# Patient Record
Sex: FEMALE | URBAN - METROPOLITAN AREA
[De-identification: names, ages, dates, MRNs, and addresses within clinical notes are randomized per-mention and may not be internally consistent; named-entity substitution may affect disease eponyms.]

---

## 2023-01-11 ENCOUNTER — TELEPHONE (OUTPATIENT)
Dept: FAMILY MEDICINE CLINIC | Age: 33
End: 2023-01-11

## 2023-01-11 NOTE — TELEPHONE ENCOUNTER
----- Message from Linda Amato sent at 1/9/2023 12:35 PM EST -----  Subject: Appointment Request    Reason for Call: New Patient/New to Provider Appointment needed: New   Patient Request Appointment    QUESTIONS    Reason for appointment request? No appointments available during search     Additional Information for Provider? new patient establishing care, please   call patient to schedule first appointment   ---------------------------------------------------------------------------  --------------  [id number] [employer]  [phone]; OK to leave message on voicemail  ---------------------------------------------------------------------------  --------------  SCRIPT ANSWERS  COVID Screen: Toro Varma

## 2023-01-31 ENCOUNTER — OFFICE VISIT (OUTPATIENT)
Dept: FAMILY MEDICINE CLINIC | Age: 33
End: 2023-01-31
Payer: COMMERCIAL

## 2023-01-31 VITALS
HEIGHT: 62 IN | BODY MASS INDEX: 26.68 KG/M2 | DIASTOLIC BLOOD PRESSURE: 74 MMHG | OXYGEN SATURATION: 99 % | SYSTOLIC BLOOD PRESSURE: 116 MMHG | WEIGHT: 145 LBS | HEART RATE: 85 BPM

## 2023-01-31 DIAGNOSIS — Z13.1 SCREENING FOR DIABETES MELLITUS: ICD-10-CM

## 2023-01-31 DIAGNOSIS — R74.8 ELEVATED LIVER ENZYMES: ICD-10-CM

## 2023-01-31 DIAGNOSIS — Z00.01 ENCOUNTER FOR ROUTINE ADULT HEALTH EXAMINATION WITH ABNORMAL FINDINGS: Primary | ICD-10-CM

## 2023-01-31 DIAGNOSIS — Z13.6 ENCOUNTER FOR LIPID SCREENING FOR CARDIOVASCULAR DISEASE: ICD-10-CM

## 2023-01-31 DIAGNOSIS — Z13.220 ENCOUNTER FOR LIPID SCREENING FOR CARDIOVASCULAR DISEASE: ICD-10-CM

## 2023-01-31 PROBLEM — K37 APPENDICITIS: Status: ACTIVE | Noted: 2021-07-29

## 2023-01-31 PROCEDURE — 99385 PREV VISIT NEW AGE 18-39: CPT | Performed by: FAMILY MEDICINE

## 2023-01-31 PROCEDURE — 3725F SCREEN DEPRESSION PERFORMED: CPT | Performed by: FAMILY MEDICINE

## 2023-01-31 SDOH — ECONOMIC STABILITY: TRANSPORTATION INSECURITY
IN THE PAST 12 MONTHS, HAS LACK OF TRANSPORTATION KEPT YOU FROM MEETINGS, WORK, OR FROM GETTING THINGS NEEDED FOR DAILY LIVING?: NO

## 2023-01-31 SDOH — ECONOMIC STABILITY: FOOD INSECURITY: WITHIN THE PAST 12 MONTHS, THE FOOD YOU BOUGHT JUST DIDN'T LAST AND YOU DIDN'T HAVE MONEY TO GET MORE.: NEVER TRUE

## 2023-01-31 SDOH — ECONOMIC STABILITY: FOOD INSECURITY: WITHIN THE PAST 12 MONTHS, YOU WORRIED THAT YOUR FOOD WOULD RUN OUT BEFORE YOU GOT MONEY TO BUY MORE.: NEVER TRUE

## 2023-01-31 SDOH — ECONOMIC STABILITY: TRANSPORTATION INSECURITY
IN THE PAST 12 MONTHS, HAS THE LACK OF TRANSPORTATION KEPT YOU FROM MEDICAL APPOINTMENTS OR FROM GETTING MEDICATIONS?: NO

## 2023-01-31 ASSESSMENT — PATIENT HEALTH QUESTIONNAIRE - PHQ9
SUM OF ALL RESPONSES TO PHQ9 QUESTIONS 1 & 2: 0
SUM OF ALL RESPONSES TO PHQ QUESTIONS 1-9: 0
SUM OF ALL RESPONSES TO PHQ QUESTIONS 1-9: 0
2. FEELING DOWN, DEPRESSED OR HOPELESS: 0
1. LITTLE INTEREST OR PLEASURE IN DOING THINGS: 0
SUM OF ALL RESPONSES TO PHQ QUESTIONS 1-9: 0
SUM OF ALL RESPONSES TO PHQ QUESTIONS 1-9: 0

## 2023-01-31 ASSESSMENT — SOCIAL DETERMINANTS OF HEALTH (SDOH): HOW HARD IS IT FOR YOU TO PAY FOR THE VERY BASICS LIKE FOOD, HOUSING, MEDICAL CARE, AND HEATING?: NOT VERY HARD

## 2023-01-31 NOTE — PROGRESS NOTES
Chief Complaint   Patient presents with    New Patient       SUBJECTIVE:   Dempsey Ganser is a 27 y.o. female presenting to [facility] care. HPI:   No concerns   Hasn't had a physical or blood work in 3yrs. Was required to be seen when she worked with [employer]    Family hx of breast Ca- maternal aunt (diagnosed at 35yrs and had it twice); great grandmother on mom's side; mom has had thyroid ca (at 40yrs) and full hyster d/t 'something w/ her ovaries' not cancer    Family hx of CRC- no    Pap- cotest 2022 neg; due in 2025; no hx of abnormal paps. Hx of dense breast tissue prior to kids and had 3 breast US  LMP 23; condoms for contraception. Not trying for kids right now. Maternal Grandmother  from lung Ca; no hx of smoking  Paternal grandparents  from lung Ca, hx of smoking    Meds: Probiotic, MTV, vitamin D, vitamin B complex    SH -, 3 kids. 3 SVDs-  no hx of complications w/ pregnancy or delivery; was  at [employer]. 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 Patient Active Problem List   Diagnosis    Appendicitis    Elevated liver enzymes     History reviewed. No pertinent past medical history.   Past Surgical History:   Procedure Laterality Date    APPENDECTOMY      WRIST SURGERY Right      Social History     Socioeconomic History    Marital status: Unknown     Spouse name: None    Number of children: None    Years of education: None    Highest education level: None   Tobacco Use    Smoking status: Never    Smokeless tobacco: Never   Substance and Sexual Activity    Alcohol use: Not Currently    Drug use: Never     Social Determinants of Health     Financial Resource Strain: Low Risk     Difficulty of Paying Living Expenses: Not very hard   Food Insecurity: No Food Insecurity    Worried About Running Out of Food in the Last Year: Never true    Ran Out of Food in the Last Year: Never true Transportation Needs: No Transportation Needs    Lack of Transportation (Medical): No    Lack of Transportation (Non-Medical): No     History reviewed. No pertinent family history. No current outpatient medications on file. No current facility-administered medications for this visit. Penicillins and Amoxicillin   Health Maintenance   Topic Date Due    COVID-19 Vaccine (1) Never done    Varicella vaccine (1 of 2 - 2-dose childhood series) Never done    Depression Screen  Never done    Cervical cancer screen  Never done    Flu vaccine (1) 08/01/2022    DTaP/Tdap/Td vaccine (2 - Td or Tdap) 06/28/2026    Hepatitis A vaccine  Aged Out    Hib vaccine  Aged Out    Meningococcal (ACWY) vaccine  Aged Out    Pneumococcal 0-64 years Vaccine  Aged Out    Hepatitis C screen  Discontinued    HIV screen  Discontinued       Review of Systems:  General: No F/C/NS/fatigue/wt loss   Cardiovascular: No CP  Respiratory: No SOB  GI: No N/V/D/C/abd pain/blood in stool  Neuro: No HA/weakness  Psych: No depressed mood/anxiety  Musculoskeletal: No myalgias    OBJECTIVE:  /74 (Site: Left Upper Arm, Position: Sitting, Cuff Size: Medium Adult)   Pulse 85   Ht 5' 2\" (1.575 m)   Wt 145 lb (65.8 kg)   SpO2 99%   BMI 26.52 kg/m²      Physical exam:  afebrile, vitals reviewed  Gen:  WD, WN, NAD, A&Ox3, pleasant  Eyes:  Sclerae clear  Neck:  Supple, No cervical or submandibular LAD. No obvious thyromegaly. Heart:  RRR, no murmur, rubs, gallops  Lungs:  CTAB, no W/R/R  Abd:  soft, NT/ND  Skin: No obvious rashes     ASSESSMENT/PLAN:  1. Encounter for routine adult health examination with abnormal findings  Preventative Medicine / HCM visit with no emergent concerns.    - will call with abn labs  - Pap smear UTD- due in nov 2027   - Mammogram- due at age 34   - Colon cancer screening - due at 37  - HIV & HCV roger screen neg in previously pregnancies  - Recommended yearly HCM visits  - Encouraged annual flu shot and age-appropriate immunizations  - Discussed importance of healthy diet and exercise (5x/week, 20-30 minutes of sustained cardiovascular training)    2. Screening for diabetes mellitus  -     Comprehensive Metabolic Panel; Future  3. Encounter for lipid screening for cardiovascular disease  -     Lipid Panel; Future  4. Elevated liver enzymes  Past issue. LFT elevated in 2021 when she had appendicitis. Will recheck for resolution  -     Comprehensive Metabolic Panel; Future    Return in about 1 year (around 1/31/2024) for annual exam.    Electronically signed by Nan Miller MD on 1/31/2023 at 8:35 AM EST    Please note, portions of this note were completed with a voice recognition program.  Although every effort was made to ensure the accuracy of this automated transcription, some errors in transcription may have occurred.